# Patient Record
Sex: FEMALE | Race: WHITE | URBAN - METROPOLITAN AREA
[De-identification: names, ages, dates, MRNs, and addresses within clinical notes are randomized per-mention and may not be internally consistent; named-entity substitution may affect disease eponyms.]

---

## 2020-09-08 ENCOUNTER — OFFICE VISIT (OUTPATIENT)
Dept: URBAN - METROPOLITAN AREA CLINIC 72 | Facility: CLINIC | Age: 70
End: 2020-09-08
Payer: MEDICARE

## 2020-09-08 DIAGNOSIS — H52.4 PRESBYOPIA: ICD-10-CM

## 2020-09-08 DIAGNOSIS — H25.813 COMBINED FORMS OF AGE-RELATED CATARACT, BILATERAL: Primary | ICD-10-CM

## 2020-09-08 PROCEDURE — 99204 OFFICE O/P NEW MOD 45 MIN: CPT | Performed by: OPTOMETRIST

## 2020-09-08 PROCEDURE — 92133 CPTRZD OPH DX IMG PST SGM ON: CPT | Performed by: OPTOMETRIST

## 2020-09-08 PROCEDURE — 92134 CPTRZ OPH DX IMG PST SGM RTA: CPT | Performed by: OPTOMETRIST

## 2020-09-08 ASSESSMENT — INTRAOCULAR PRESSURE
OD: 15
OS: 16

## 2020-09-08 ASSESSMENT — VISUAL ACUITY
OS: 20/200
OD: 20/25

## 2020-09-08 NOTE — IMPRESSION/PLAN
Impression: Vitreous degeneration, right eye: H43.811 Mac OCT shows PVD OD-stable.  no holes or tears noted Plan: Observe for now

## 2020-09-08 NOTE — IMPRESSION/PLAN
Impression: Combined forms of age-related cataract, bilateral: H25.813 - discussed cataract OU and PSC changes OS. PSC changes may be secondary to previous steroid use. Left XT noted though pt reports va was equal OU at one point. OCT appears normal OD but no view OS secondary to the cataract. Discussed and recommend pre op for cat sx OS then OD.  Will do OU rather than OS only due to risk of anesometropia Plan: Sched pt N/A pre op for cat sx OS then OD

## 2020-10-06 ENCOUNTER — PRE-OPERATIVE VISIT (OUTPATIENT)
Dept: URBAN - METROPOLITAN AREA CLINIC 71 | Facility: CLINIC | Age: 70
End: 2020-10-06
Payer: MEDICARE

## 2020-10-06 DIAGNOSIS — H02.109 ECTROPION OF EYE: ICD-10-CM

## 2020-10-06 DIAGNOSIS — H25.812 COMBINED FORMS OF AGE-RELATED CATARACT, LEFT EYE: ICD-10-CM

## 2020-10-06 DIAGNOSIS — H43.811 VITREOUS DEGENERATION, RIGHT EYE: ICD-10-CM

## 2020-10-06 PROCEDURE — 92136 OPHTHALMIC BIOMETRY: CPT | Performed by: OPHTHALMOLOGY

## 2020-10-06 PROCEDURE — 92014 COMPRE OPH EXAM EST PT 1/>: CPT | Performed by: OPHTHALMOLOGY

## 2020-10-06 RX ORDER — CIPROFLOXACIN HYDROCHLORIDE 3.5 MG/ML
0.3 % SOLUTION/ DROPS TOPICAL
Qty: 5 | Refills: 0 | Status: INACTIVE
Start: 2020-10-06 | End: 2020-10-19

## 2020-10-06 NOTE — IMPRESSION/PLAN
Impression: Combined forms of age-related cataract, bilateral: H25.813. Plan: Discussed diagnosis in detail with patient. Surgical treatment is required. Patient elects to have surgery. Surgical risks and benefits were discussed, explained and understood by patient. Lid scrubs and hygiene were explained. Pre op instructions given and understood. Post op instructions given and understood. PROCEED WITH CARE PLUS CATARACT SURGERY FOR DISTANCE OU. TRIMOXI WITH CIPRO BID X 10 DAYS STARTING THE DAY BEFORE SURGERY, NO RETINAL CLEARANCE NEEDED PER DR. RENDON, CS VIDEO DONE

## 2020-10-19 ENCOUNTER — SURGERY (OUTPATIENT)
Dept: URBAN - METROPOLITAN AREA SURGERY 45 | Facility: SURGERY | Age: 70
End: 2020-10-19
Payer: MEDICARE

## 2020-10-19 ENCOUNTER — SURGERY (OUTPATIENT)
Dept: URBAN - METROPOLITAN AREA SURGERY 44 | Facility: SURGERY | Age: 70
End: 2020-10-19
Payer: MEDICARE

## 2020-10-19 PROCEDURE — G8907 PT DOC NO EVENTS ON DISCHARG: HCPCS | Performed by: CLINIC/CENTER

## 2020-10-19 PROCEDURE — 66984 XCAPSL CTRC RMVL W/O ECP: CPT | Performed by: OPHTHALMOLOGY

## 2020-10-19 PROCEDURE — G8918 PT W/O PREOP ORDER IV AB PRO: HCPCS | Performed by: CLINIC/CENTER

## 2020-10-20 ENCOUNTER — OFFICE VISIT (OUTPATIENT)
Dept: URBAN - METROPOLITAN AREA CLINIC 68 | Facility: CLINIC | Age: 70
End: 2020-10-20
Payer: MEDICARE

## 2020-10-20 PROCEDURE — 99204 OFFICE O/P NEW MOD 45 MIN: CPT | Performed by: OPHTHALMOLOGY

## 2020-10-20 PROCEDURE — 92134 CPTRZ OPH DX IMG PST SGM RTA: CPT | Performed by: OPHTHALMOLOGY

## 2020-10-26 ENCOUNTER — SURGERY (OUTPATIENT)
Dept: URBAN - METROPOLITAN AREA SURGERY 45 | Facility: SURGERY | Age: 70
End: 2020-10-26
Payer: MEDICARE

## 2020-10-26 ENCOUNTER — SURGERY (OUTPATIENT)
Dept: URBAN - METROPOLITAN AREA EXTERNAL CLINIC 26 | Facility: EXTERNAL CLINIC | Age: 70
End: 2020-10-26
Payer: MEDICARE

## 2020-10-26 PROCEDURE — 67041 VIT FOR MACULAR PUCKER: CPT | Performed by: OPHTHALMOLOGY

## 2020-10-26 PROCEDURE — G8907 PT DOC NO EVENTS ON DISCHARG: HCPCS | Performed by: CLINIC/CENTER

## 2020-10-26 PROCEDURE — G8918 PT W/O PREOP ORDER IV AB PRO: HCPCS | Performed by: CLINIC/CENTER

## 2020-10-27 ENCOUNTER — POST-OPERATIVE VISIT (OUTPATIENT)
Dept: URBAN - METROPOLITAN AREA CLINIC 73 | Facility: CLINIC | Age: 70
End: 2020-10-27
Payer: MEDICARE

## 2020-10-27 PROCEDURE — 99024 POSTOP FOLLOW-UP VISIT: CPT | Performed by: OPHTHALMOLOGY

## 2020-10-27 ASSESSMENT — INTRAOCULAR PRESSURE: OS: 11

## 2020-10-27 NOTE — IMPRESSION/PLAN
Impression: S/P 25g/PPV/remove RLF/MP/dex OS x RLF 10/26/2020 ([facility]) Plan: Retina is attached. No s/s of infection IOP is good at (11). Will need to place ACIOL. RBA discussed. The patient elects surgery. Drops Vigamox QID Pred QID Plan: 1. As and Ks 2. 25 g PPV / MP / PRP / ACIOL OS

## 2020-11-01 ENCOUNTER — SURGERY (OUTPATIENT)
Dept: URBAN - METROPOLITAN AREA EXTERNAL CLINIC 26 | Facility: EXTERNAL CLINIC | Age: 70
End: 2020-11-01
Payer: MEDICARE

## 2020-11-01 PROCEDURE — 67040 LASER TREATMENT OF RETINA: CPT | Performed by: OPHTHALMOLOGY

## 2020-11-03 ENCOUNTER — POST-OPERATIVE VISIT (OUTPATIENT)
Dept: URBAN - METROPOLITAN AREA CLINIC 68 | Facility: CLINIC | Age: 70
End: 2020-11-03
Payer: MEDICARE

## 2020-11-03 PROCEDURE — 99024 POSTOP FOLLOW-UP VISIT: CPT | Performed by: OPHTHALMOLOGY

## 2020-11-03 ASSESSMENT — INTRAOCULAR PRESSURE
OD: 15
OS: 12

## 2020-11-03 NOTE — IMPRESSION/PLAN
Impression: S/P 25G RLF, ILM, PRP. AC IOL OS 11/2/2020 (0 [facility]) Plan: Retina is attached. No s/s of infection IOP is good at (12) Drops Vigamox QID Pred QID 

RTC 1-2 weeks

## 2020-11-10 ENCOUNTER — POST-OPERATIVE VISIT (OUTPATIENT)
Dept: URBAN - METROPOLITAN AREA CLINIC 73 | Facility: CLINIC | Age: 70
End: 2020-11-10
Payer: MEDICARE

## 2020-11-10 PROCEDURE — 99024 POSTOP FOLLOW-UP VISIT: CPT | Performed by: OPHTHALMOLOGY

## 2020-11-10 ASSESSMENT — INTRAOCULAR PRESSURE
OD: 16
OS: 19

## 2020-11-10 NOTE — IMPRESSION/PLAN
Impression: S/P 25G RLF, ILM, PRP. AC IOL OS 11/02/2020  ([facility]) Plan: Retina is attached. No s/s of infection IOP is good at (19) Drops Vigamox QID Pred QID 

RTC 3 months, OCT OU

## 2020-11-17 ENCOUNTER — POST-OPERATIVE VISIT (OUTPATIENT)
Dept: URBAN - METROPOLITAN AREA CLINIC 71 | Facility: CLINIC | Age: 70
End: 2020-11-17
Payer: MEDICARE

## 2020-11-17 DIAGNOSIS — Z48.810 ENCOUNTER FOR SURGICAL AFTERCARE FOLLOWING SURGERY ON A SENSE ORGAN: Primary | ICD-10-CM

## 2020-11-17 PROCEDURE — 99024 POSTOP FOLLOW-UP VISIT: CPT | Performed by: OPHTHALMOLOGY

## 2020-11-17 ASSESSMENT — INTRAOCULAR PRESSURE
OD: 15
OS: 13

## 2020-11-17 NOTE — IMPRESSION/PLAN
Impression: S/P 25G RLF, ILM, PRP. AC IOL OS OS - 15 Days. Encounter for surgical aftercare following surgery on a sense organ  Z48.810. Plan: looks good, healing well. Ok to proceed with the second eye.

## 2020-12-01 ENCOUNTER — SURGERY (OUTPATIENT)
Dept: URBAN - METROPOLITAN AREA SURGERY 44 | Facility: SURGERY | Age: 70
End: 2020-12-01
Payer: MEDICARE

## 2020-12-01 PROCEDURE — 66984 XCAPSL CTRC RMVL W/O ECP: CPT | Performed by: OPHTHALMOLOGY

## 2020-12-02 ENCOUNTER — POST-OPERATIVE VISIT (OUTPATIENT)
Dept: URBAN - METROPOLITAN AREA CLINIC 71 | Facility: CLINIC | Age: 70
End: 2020-12-02
Payer: MEDICARE

## 2020-12-02 PROCEDURE — 99024 POSTOP FOLLOW-UP VISIT: CPT | Performed by: OPHTHALMOLOGY

## 2020-12-02 ASSESSMENT — INTRAOCULAR PRESSURE
OD: 14
OS: 10

## 2020-12-02 NOTE — IMPRESSION/PLAN
Impression: S/P CE/Standard IOL SA60AT 24.0 ORA OD - 1 Day. Presence of intraocular lens  Z96.1. Plan: looks good, healing well.  Will have pt follow up with Dr Soni Greene --Continue Ofloxacin 0.3%--Continue Pred-Acetate--Continue flurbiprofen

## 2021-03-02 ENCOUNTER — OFFICE VISIT (OUTPATIENT)
Dept: URBAN - METROPOLITAN AREA CLINIC 73 | Facility: CLINIC | Age: 71
End: 2021-03-02
Payer: MEDICARE

## 2021-03-02 PROCEDURE — 99214 OFFICE O/P EST MOD 30 MIN: CPT | Performed by: OPHTHALMOLOGY

## 2021-03-02 PROCEDURE — 67028 INJECTION EYE DRUG: CPT | Performed by: OPHTHALMOLOGY

## 2021-03-02 PROCEDURE — 92134 CPTRZ OPH DX IMG PST SGM RTA: CPT | Performed by: OPHTHALMOLOGY

## 2021-03-02 ASSESSMENT — INTRAOCULAR PRESSURE
OS: 15
OD: 14

## 2021-03-02 NOTE — IMPRESSION/PLAN
Impression: CME, OS. Plan: s/p surgery OS. Exam and OCT reveal CME OS (458 microns). Recommend Triesence. RBA discussed. Pt elects Triesence. After 2% Subconjunctival anesthesia & betadine prep, 0.2 mg Triesence was injected in the eye. 38 mg of the vial was discarded. No complications. Cold compress and Tylenol suggested for pain if needed. Thanks, Carey Return in 1 month for re-eval CME, OCT OU, IVFA OS 1st

## 2021-03-02 NOTE — IMPRESSION/PLAN
Impression: h/o RLF OS
 S/P 25G RLF, ILM, PRP. AC IOL OS 11/02/2020  (0 [facility]) Plan: The patient has done well with surgery.  Follow

## 2021-03-30 ENCOUNTER — OFFICE VISIT (OUTPATIENT)
Dept: URBAN - METROPOLITAN AREA CLINIC 73 | Facility: CLINIC | Age: 71
End: 2021-03-30
Payer: MEDICARE

## 2021-03-30 DIAGNOSIS — Z96.1 PRESENCE OF INTRAOCULAR LENS: ICD-10-CM

## 2021-03-30 PROCEDURE — 92014 COMPRE OPH EXAM EST PT 1/>: CPT | Performed by: OPHTHALMOLOGY

## 2021-03-30 PROCEDURE — 67028 INJECTION EYE DRUG: CPT | Performed by: OPHTHALMOLOGY

## 2021-03-30 PROCEDURE — 92235 FLUORESCEIN ANGRPH MLTIFRAME: CPT | Performed by: OPHTHALMOLOGY

## 2021-03-30 PROCEDURE — 92250 FUNDUS PHOTOGRAPHY W/I&R: CPT | Performed by: OPHTHALMOLOGY

## 2021-03-30 ASSESSMENT — INTRAOCULAR PRESSURE
OS: 10
OD: 9

## 2021-03-30 NOTE — IMPRESSION/PLAN
Impression: CME, OS.
  s/p Triesence x1 last 3/2/21 Plan: s/p surgery OS. Exam and OCT reveal CME OS (prior 458 microns). An IVFA from 03/30/2021 demonstrated no telangiectasia. Fundus Photos from 03/30/2021 demonstrated no IRH. Recommend Triesence. RBA discussed. Pt elects Triesence. After 2% Subconjunctival anesthesia & betadine prep, 0.2 mg Triesence was injected in the eye. 38 mg of the vial was discarded. No complications. Cold compress and Tylenol suggested for pain if needed. Thanks, Carey Return in 1 month for re-eval CME, OCT OU, possible Triesence

## 2021-03-30 NOTE — IMPRESSION/PLAN
Impression: h/o RLF OS
 S/P 25G RLF, ILM, PRP. AC IOL OS 11/02/2020  (0 [facility]) Plan: The patient has done well with surgery.  Follow

## 2021-05-18 ENCOUNTER — OFFICE VISIT (OUTPATIENT)
Dept: URBAN - METROPOLITAN AREA CLINIC 68 | Facility: CLINIC | Age: 71
End: 2021-05-18
Payer: MEDICARE

## 2021-05-18 DIAGNOSIS — H43.813 VITREOUS DEGENERATION, BILATERAL: ICD-10-CM

## 2021-05-18 DIAGNOSIS — H35.81 RETINAL EDEMA: Primary | ICD-10-CM

## 2021-05-18 PROCEDURE — 67028 INJECTION EYE DRUG: CPT | Performed by: OPHTHALMOLOGY

## 2021-05-18 PROCEDURE — 92014 COMPRE OPH EXAM EST PT 1/>: CPT | Performed by: OPHTHALMOLOGY

## 2021-05-18 PROCEDURE — 92134 CPTRZ OPH DX IMG PST SGM RTA: CPT | Performed by: OPHTHALMOLOGY

## 2021-05-18 ASSESSMENT — INTRAOCULAR PRESSURE
OS: 8
OD: 11

## 2021-05-18 NOTE — IMPRESSION/PLAN
Impression: h/o RLF OS
 S/P 25G RLF, ILM, PRP. AC IOL OS 11/02/2020  (0 [facility]) Plan: The patient has done well with surgery.  Follow

## 2021-05-18 NOTE — IMPRESSION/PLAN
Impression: CME, OS.
  s/p Triesence x 2  last 03/30/2021  Plan: s/p surgery OS. Exam and OCT reveal CME OS (374 microns, from 458 microns). An IVFA from 03/30/2021 demonstrated no telangiectasia. Fundus Photos from 03/30/2021 demonstrated no IRH. Recommend Triesence. RBA discussed. Pt elects Triesence. After 2% Subconjunctival anesthesia & betadine prep, 0.2 mg Triesence was injected in the eye. 38 mg of the vial was discarded. No complications. Cold compress and Tylenol suggested for pain if needed. Thanks, [employer] Return in 1 month for re-eval CME, OCT OU, possible Triesence

## 2021-08-31 ENCOUNTER — OFFICE VISIT (OUTPATIENT)
Dept: URBAN - METROPOLITAN AREA CLINIC 73 | Facility: CLINIC | Age: 71
End: 2021-08-31
Payer: MEDICARE

## 2021-08-31 DIAGNOSIS — H59.022 CATARACT (LENS) FRAGMENTS IN EYE FOLLOWING CATARACT SURGERY, LEFT EYE: ICD-10-CM

## 2021-08-31 DIAGNOSIS — H04.123 DRY EYE SYNDROME OF BILATERAL LACRIMAL GLANDS: ICD-10-CM

## 2021-08-31 PROCEDURE — 92014 COMPRE OPH EXAM EST PT 1/>: CPT | Performed by: OPHTHALMOLOGY

## 2021-08-31 PROCEDURE — 67028 INJECTION EYE DRUG: CPT | Performed by: OPHTHALMOLOGY

## 2021-08-31 ASSESSMENT — INTRAOCULAR PRESSURE
OS: 14
OD: 15

## 2021-08-31 NOTE — IMPRESSION/PLAN
Impression: h/o RLF OS
 S/P 25G RLF, ILM, PRP. AC IOL OS 11/02/2020  (0 [facility]) Plan: The patient has done well with surgery.  Follow

## 2021-08-31 NOTE — IMPRESSION/PLAN
Impression: CME, OS.
  s/p Triesence x 3  last 05/18/2021  Plan: s/p surgery OS. Exam and OCT reveal CME OS (374 microns, from 458 microns). An IVFA from 03/30/2021 demonstrated no telangiectasia. Fundus Photos from 03/30/2021 demonstrated no IRH. Recommend Triesence. RBA discussed. Pt elects Triesence. After 2% Subconjunctival anesthesia & betadine prep, 0.2 mg Triesence was injected in the eye. 38 mg of the vial was discarded. No complications. Cold compress and Tylenol suggested for pain if needed. Thanks, She Llanos Return in 1 month for re-eval CME, OCT OU, possible Triesence

## 2022-02-01 ENCOUNTER — OFFICE VISIT (OUTPATIENT)
Dept: URBAN - METROPOLITAN AREA CLINIC 73 | Facility: CLINIC | Age: 72
End: 2022-02-01
Payer: MEDICARE

## 2022-02-01 PROCEDURE — 92014 COMPRE OPH EXAM EST PT 1/>: CPT | Performed by: OPHTHALMOLOGY

## 2022-02-01 PROCEDURE — 67028 INJECTION EYE DRUG: CPT | Performed by: OPHTHALMOLOGY

## 2022-02-01 PROCEDURE — 92134 CPTRZ OPH DX IMG PST SGM RTA: CPT | Performed by: OPHTHALMOLOGY

## 2022-02-01 ASSESSMENT — INTRAOCULAR PRESSURE
OD: 15
OS: 17

## 2022-02-01 NOTE — IMPRESSION/PLAN
Impression: CME, OS.
  s/p Triesence x 4  last 08/31/2021  Plan: The patient was lost to follow up. s/p surgery OS. Exam and OCT reveal CME OS (522 microns, from 374 microns, from 458 microns). An IVFA from 03/30/2021 demonstrated no telangiectasia. Fundus Photos from 03/30/2021 demonstrated no IRH. Triesence is on back order. Recommend Avastin. RBA discussed. Pt elects Avastin. After 2% Subconjunctival anesthesia & betadine prep, Avastin was injected in the eye. Overall was discarded. No complications. Cold compress and Tylenol suggested for pain if needed. Thanks, Hemanth Steiner Return in 1 month for re-eval CME, OCT OU, possible Triesence vs Avastin

## 2022-03-01 ENCOUNTER — OFFICE VISIT (OUTPATIENT)
Dept: URBAN - METROPOLITAN AREA CLINIC 73 | Facility: CLINIC | Age: 72
End: 2022-03-01
Payer: MEDICARE

## 2022-03-01 PROCEDURE — 92134 CPTRZ OPH DX IMG PST SGM RTA: CPT | Performed by: OPHTHALMOLOGY

## 2022-03-01 PROCEDURE — 67028 INJECTION EYE DRUG: CPT | Performed by: OPHTHALMOLOGY

## 2022-03-01 PROCEDURE — 92014 COMPRE OPH EXAM EST PT 1/>: CPT | Performed by: OPHTHALMOLOGY

## 2022-03-01 ASSESSMENT — INTRAOCULAR PRESSURE
OS: 9
OD: 12

## 2022-03-01 NOTE — IMPRESSION/PLAN
Impression: CME, OS.
  s/p Triesence x 4  last 08/31/2021
s/p Avastin x last 02/01/2022 Plan: Exam and OCT reveal CME OS (559 microns, from 374 microns, from 458 microns). An IVFA from 03/30/2021 demonstrated no telangiectasia. Fundus Photos from 03/30/2021 demonstrated no IRH. Triesence is on back order. Recommend Avastin. RBA discussed. Pt elects Avastin. After 2% Subconjunctival anesthesia & betadine prep, Avastin was injected in the eye. Overall was discarded. No complications. Cold compress and Tylenol suggested for pain if needed. Thanks, Carey Return in 1 month for re-eval CME, OCT OU, possible Triesence vs Avastin

## 2022-03-01 NOTE — IMPRESSION/PLAN
Impression: h/o RLF OS
 S/P 25G RLF, ILM, PRP. AC IOL OS 11/02/2020  (0 [facility]) Plan: The patient has done well with surgery.  Follow

## 2022-04-26 ENCOUNTER — OFFICE VISIT (OUTPATIENT)
Dept: URBAN - METROPOLITAN AREA CLINIC 73 | Facility: CLINIC | Age: 72
End: 2022-04-26
Payer: MEDICARE

## 2022-04-26 DIAGNOSIS — H04.123 DRY EYE SYNDROME OF BILATERAL LACRIMAL GLANDS: ICD-10-CM

## 2022-04-26 DIAGNOSIS — Z96.1 PRESENCE OF INTRAOCULAR LENS: ICD-10-CM

## 2022-04-26 DIAGNOSIS — H35.81 RETINAL EDEMA: Primary | ICD-10-CM

## 2022-04-26 DIAGNOSIS — H43.813 VITREOUS DEGENERATION, BILATERAL: ICD-10-CM

## 2022-04-26 DIAGNOSIS — H59.022 CATARACT (LENS) FRAGMENTS IN EYE FOLLOWING CATARACT SURGERY, LEFT EYE: ICD-10-CM

## 2022-04-26 PROCEDURE — 92014 COMPRE OPH EXAM EST PT 1/>: CPT | Performed by: OPHTHALMOLOGY

## 2022-04-26 PROCEDURE — 92134 CPTRZ OPH DX IMG PST SGM RTA: CPT | Performed by: OPHTHALMOLOGY

## 2022-04-26 PROCEDURE — 67028 INJECTION EYE DRUG: CPT | Performed by: OPHTHALMOLOGY

## 2022-04-26 ASSESSMENT — INTRAOCULAR PRESSURE
OS: 10
OD: 12

## 2022-04-26 NOTE — IMPRESSION/PLAN
Impression: CME, OS.
  s/p Triesence x 4  last 08/31/2021
s/p Avastin x last 03/01/2022  Plan: Exam and OCT reveal CME OS (503 microns, from 374 microns, from 458 microns). An IVFA from 03/30/2021 demonstrated no telangiectasia. Fundus Photos from 03/30/2021 demonstrated no IRH. Triesence is on back order. Recommend Avastin. RBA discussed. Pt elects Avastin. After 2% Subconjunctival anesthesia & betadine prep, Avastin was injected in the eye. Overall was discarded. No complications. Cold compress and Tylenol suggested for pain if needed. Thanks, Carey Return in 1 month for re-eval CME, OCT OU, possible Triesence vs Avastin

## 2022-06-28 ENCOUNTER — OFFICE VISIT (OUTPATIENT)
Dept: URBAN - METROPOLITAN AREA CLINIC 68 | Facility: CLINIC | Age: 72
End: 2022-06-28
Payer: MEDICARE

## 2022-06-28 DIAGNOSIS — Z96.1 PRESENCE OF INTRAOCULAR LENS: ICD-10-CM

## 2022-06-28 DIAGNOSIS — H43.813 VITREOUS DEGENERATION, BILATERAL: ICD-10-CM

## 2022-06-28 DIAGNOSIS — H04.123 DRY EYE SYNDROME OF BILATERAL LACRIMAL GLANDS: ICD-10-CM

## 2022-06-28 DIAGNOSIS — H59.022 CATARACT (LENS) FRAGMENTS IN EYE FOLLOWING CATARACT SURGERY, LEFT EYE: ICD-10-CM

## 2022-06-28 DIAGNOSIS — H35.81 RETINAL EDEMA: Primary | ICD-10-CM

## 2022-06-28 PROCEDURE — 92134 CPTRZ OPH DX IMG PST SGM RTA: CPT | Performed by: OPHTHALMOLOGY

## 2022-06-28 PROCEDURE — 92014 COMPRE OPH EXAM EST PT 1/>: CPT | Performed by: OPHTHALMOLOGY

## 2022-06-28 PROCEDURE — 67028 INJECTION EYE DRUG: CPT | Performed by: OPHTHALMOLOGY

## 2022-06-28 ASSESSMENT — INTRAOCULAR PRESSURE
OS: 12
OD: 15

## 2022-06-28 NOTE — IMPRESSION/PLAN
Impression: h/o RLF OS
 S/P 25G RLF, ILM, PRP. AC IOL OS 11/02/2020  (0 [facility]) Plan: The patient has done well with surgery.  Follow

## 2022-06-28 NOTE — IMPRESSION/PLAN
Impression: CME, OS.
  s/p Triesence x 4  last 08/31/2021
s/p Avastin x last 04/26/2022 Plan: Exam and OCT reveal CME OS (521 microns, from 374 microns, from 458 microns). An IVFA from 03/30/2021 demonstrated no telangiectasia. Fundus Photos from 03/30/2021 demonstrated no IRH. Triesence is on back order. Recommend Avastin. RBA discussed. Pt elects Avastin. After 2% Subconjunctival anesthesia & betadine prep, Avastin was injected in the eye. Overall was discarded. No complications. Cold compress and Tylenol suggested for pain if needed. Thanks, Carey Return in 1 month for re-eval CME, OCT OU, possible Triesence vs Avastin

## 2022-09-27 ENCOUNTER — OFFICE VISIT (OUTPATIENT)
Facility: LOCATION | Age: 72
End: 2022-09-27
Payer: MEDICARE

## 2022-09-27 DIAGNOSIS — Z96.1 PRESENCE OF INTRAOCULAR LENS: ICD-10-CM

## 2022-09-27 DIAGNOSIS — H59.022 CATARACT (LENS) FRAGMENTS IN EYE FOLLOWING CATARACT SURGERY, LEFT EYE: ICD-10-CM

## 2022-09-27 DIAGNOSIS — H35.81 RETINAL EDEMA: Primary | ICD-10-CM

## 2022-09-27 DIAGNOSIS — H04.123 DRY EYE SYNDROME OF BILATERAL LACRIMAL GLANDS: ICD-10-CM

## 2022-09-27 DIAGNOSIS — H43.813 VITREOUS DEGENERATION, BILATERAL: ICD-10-CM

## 2022-09-27 PROCEDURE — 92134 CPTRZ OPH DX IMG PST SGM RTA: CPT | Performed by: OPHTHALMOLOGY

## 2022-09-27 PROCEDURE — 67028 INJECTION EYE DRUG: CPT | Performed by: OPHTHALMOLOGY

## 2022-09-27 PROCEDURE — 92014 COMPRE OPH EXAM EST PT 1/>: CPT | Performed by: OPHTHALMOLOGY

## 2022-09-27 ASSESSMENT — INTRAOCULAR PRESSURE
OD: 11
OS: 13

## 2022-09-27 NOTE — IMPRESSION/PLAN
Impression: CME, OS.
  s/p Triesence x 4  last 08/31/2021
s/p Avastin x last 06/28/2022  Plan: Exam and OCT reveal CME OS (469 microns, from 521 microns, from 374 microns, from 458 microns). An IVFA from 03/30/2021 demonstrated no telangiectasia. Fundus Photos from 03/30/2021 demonstrated no IRH. Triesence is on back order. Recommend Avastin. RBA discussed. Pt elects Avastin. After 2% Subconjunctival anesthesia & betadine prep, Avastin was injected in the eye. Overall was discarded. No complications. Cold compress and Tylenol suggested for pain if needed. Thanks, Carey Return in 1 month for re-eval CME, OCT OU, possible Triesence vs Avastin

## 2022-09-27 NOTE — IMPRESSION/PLAN
Impression: h/o RLF OS
 S/P 25G RLF, ILM, PRP. AC IOL OS 11/02/2020  (0 [facility]) Plan: The patient has done well with surgery.  Follow

## 2022-11-15 ENCOUNTER — OFFICE VISIT (OUTPATIENT)
Facility: LOCATION | Age: 72
End: 2022-11-15
Payer: MEDICARE

## 2022-11-15 DIAGNOSIS — H59.022 CATARACT (LENS) FRAGMENTS IN EYE FOLLOWING CATARACT SURGERY, LEFT EYE: ICD-10-CM

## 2022-11-15 DIAGNOSIS — H43.813 VITREOUS DEGENERATION, BILATERAL: ICD-10-CM

## 2022-11-15 DIAGNOSIS — H04.123 DRY EYE SYNDROME OF BILATERAL LACRIMAL GLANDS: ICD-10-CM

## 2022-11-15 DIAGNOSIS — H35.81 RETINAL EDEMA: Primary | ICD-10-CM

## 2022-11-15 DIAGNOSIS — Z96.1 PRESENCE OF INTRAOCULAR LENS: ICD-10-CM

## 2022-11-15 PROCEDURE — 92014 COMPRE OPH EXAM EST PT 1/>: CPT | Performed by: OPHTHALMOLOGY

## 2022-11-15 PROCEDURE — 67028 INJECTION EYE DRUG: CPT | Performed by: OPHTHALMOLOGY

## 2022-11-15 PROCEDURE — 92134 CPTRZ OPH DX IMG PST SGM RTA: CPT | Performed by: OPHTHALMOLOGY

## 2022-11-15 ASSESSMENT — INTRAOCULAR PRESSURE
OD: 12
OS: 13

## 2022-11-15 NOTE — IMPRESSION/PLAN
Impression: CME, OS.
  s/p Triesence x 4  last 08/31/2021
s/p Avastin x last 09/27/2022 Plan: Exam and OCT reveal CME OS (467 microns, from 521 microns, from 374 microns, from 458 microns). An IVFA from 03/30/2021 demonstrated no telangiectasia. Fundus Photos from 03/30/2021 demonstrated no IRH. Triesence is on back order. Recommend Avastin. RBA discussed. Pt elects Avastin. After 2% Subconjunctival anesthesia & betadine prep, Avastin was injected in the eye. Overall was discarded. No complications. Cold compress and Tylenol suggested for pain if needed. Thanks, Carey Return in 1 month for re-eval CME, OCT OU, possible Triesence vs Avastin

## 2022-11-15 NOTE — IMPRESSION/PLAN
Impression: h/o RLF OS
 S/P 25G RLF, ILM, PRP. AC IOL OS 11/02/2020  (0 [facility]) Plan: The patient has done well with surgery.  Follow

## 2023-01-10 ENCOUNTER — OFFICE VISIT (OUTPATIENT)
Facility: LOCATION | Age: 73
End: 2023-01-10
Payer: MEDICARE

## 2023-01-10 DIAGNOSIS — H59.022 CATARACT (LENS) FRAGMENTS IN EYE FOLLOWING CATARACT SURGERY, LEFT EYE: ICD-10-CM

## 2023-01-10 DIAGNOSIS — H04.123 DRY EYE SYNDROME OF BILATERAL LACRIMAL GLANDS: ICD-10-CM

## 2023-01-10 DIAGNOSIS — H43.813 VITREOUS DEGENERATION, BILATERAL: ICD-10-CM

## 2023-01-10 DIAGNOSIS — Z96.1 PRESENCE OF INTRAOCULAR LENS: ICD-10-CM

## 2023-01-10 DIAGNOSIS — H35.81 RETINAL EDEMA: Primary | ICD-10-CM

## 2023-01-10 PROCEDURE — 92014 COMPRE OPH EXAM EST PT 1/>: CPT | Performed by: OPHTHALMOLOGY

## 2023-01-10 PROCEDURE — 67028 INJECTION EYE DRUG: CPT | Performed by: OPHTHALMOLOGY

## 2023-01-10 PROCEDURE — 92134 CPTRZ OPH DX IMG PST SGM RTA: CPT | Performed by: OPHTHALMOLOGY

## 2023-01-10 ASSESSMENT — INTRAOCULAR PRESSURE
OD: 15
OS: 12

## 2023-01-10 NOTE — IMPRESSION/PLAN
Impression: CME, OS.
s/p Triesence x 4  last 08/31/2021
s/p Avastin x last 11/15/2022 Plan: Exam and OCT reveal CME OS (579 microns, from 467 microns, from 521 microns, from 374 microns, from 458 microns). An IVFA from 03/30/2021 demonstrated no telangiectasia. Fundus Photos from 03/30/2021 demonstrated no IRH. Triesence is on back order. Recommend Avastin. RBA discussed. Pt elects Avastin. After 2% Subconjunctival anesthesia & betadine prep, Avastin was injected in the eye. Overall was discarded. No complications. Cold compress and Tylenol suggested for pain if needed. Thanks, Carey Return in 1 month for re-eval CME, OCT OU, possible Triesence vs Avastin

## 2023-01-10 NOTE — IMPRESSION/PLAN
Impression: h/o RLF OS
 S/P 25G RLF, ILM, PRP. AC IOL OS 11/02/2020  (0 [facility]) Plan: The patient has done well with surgery.  Follow

## 2023-02-21 ENCOUNTER — OFFICE VISIT (OUTPATIENT)
Facility: LOCATION | Age: 73
End: 2023-02-21
Payer: MEDICARE

## 2023-02-21 DIAGNOSIS — H04.123 DRY EYE SYNDROME OF BILATERAL LACRIMAL GLANDS: ICD-10-CM

## 2023-02-21 DIAGNOSIS — H35.81 RETINAL EDEMA: Primary | ICD-10-CM

## 2023-02-21 DIAGNOSIS — H59.022 CATARACT (LENS) FRAGMENTS IN EYE FOLLOWING CATARACT SURGERY, LEFT EYE: ICD-10-CM

## 2023-02-21 DIAGNOSIS — Z96.1 PRESENCE OF INTRAOCULAR LENS: ICD-10-CM

## 2023-02-21 DIAGNOSIS — H43.813 VITREOUS DEGENERATION, BILATERAL: ICD-10-CM

## 2023-02-21 PROCEDURE — 92134 CPTRZ OPH DX IMG PST SGM RTA: CPT | Performed by: OPHTHALMOLOGY

## 2023-02-21 PROCEDURE — 92014 COMPRE OPH EXAM EST PT 1/>: CPT | Performed by: OPHTHALMOLOGY

## 2023-02-21 PROCEDURE — 67028 INJECTION EYE DRUG: CPT | Performed by: OPHTHALMOLOGY

## 2023-02-21 ASSESSMENT — INTRAOCULAR PRESSURE
OS: 12
OD: 14

## 2023-02-21 NOTE — IMPRESSION/PLAN
Impression: CME, OS.
s/p Triesence x 4  last 08/31/2021
s/p Avastin x last 01/10/2023 Plan: Exam and OCT reveal CME OS (428 microns, from 579 microns, from 467 microns, from 521 microns, from 374 microns, from 458 microns). An IVFA from 03/30/2021 demonstrated no telangiectasia. Fundus Photos from 03/30/2021 demonstrated no IRH. Triesence is on back order. Recommend Avastin. RBA discussed. Pt elects Avastin. After 2% Subconjunctival anesthesia & betadine prep, Avastin was injected in the eye. Overall was discarded. No complications. Cold compress and Tylenol suggested for pain if needed. Thanks, Carey Return in 1 month for re-eval CME, OCT OU, possible Triesence vs Avastin

## 2023-04-18 ENCOUNTER — OFFICE VISIT (OUTPATIENT)
Facility: LOCATION | Age: 73
End: 2023-04-18
Payer: MEDICARE

## 2023-04-18 DIAGNOSIS — H04.123 DRY EYE SYNDROME OF BILATERAL LACRIMAL GLANDS: ICD-10-CM

## 2023-04-18 DIAGNOSIS — H35.81 RETINAL EDEMA: Primary | ICD-10-CM

## 2023-04-18 DIAGNOSIS — H43.813 VITREOUS DEGENERATION, BILATERAL: ICD-10-CM

## 2023-04-18 DIAGNOSIS — Z96.1 PRESENCE OF INTRAOCULAR LENS: ICD-10-CM

## 2023-04-18 DIAGNOSIS — H59.022 CATARACT (LENS) FRAGMENTS IN EYE FOLLOWING CATARACT SURGERY, LEFT EYE: ICD-10-CM

## 2023-04-18 PROCEDURE — 92134 CPTRZ OPH DX IMG PST SGM RTA: CPT | Performed by: OPHTHALMOLOGY

## 2023-04-18 PROCEDURE — 67028 INJECTION EYE DRUG: CPT | Performed by: OPHTHALMOLOGY

## 2023-04-18 PROCEDURE — 92014 COMPRE OPH EXAM EST PT 1/>: CPT | Performed by: OPHTHALMOLOGY

## 2023-04-18 ASSESSMENT — INTRAOCULAR PRESSURE
OD: 15
OS: 17

## 2023-04-18 NOTE — IMPRESSION/PLAN
Impression: CME, OS.
s/p Triesence x 4  last 08/31/2021
s/p Avastin x last 02/21/2023  Plan: Exam and OCT reveal CME OS (491 microns, from 428 microns, from 579 microns, from 467 microns, from 521 microns, from 374 microns, from 458 microns). An IVFA from 03/30/2021 demonstrated no telangiectasia. Fundus Photos from 03/30/2021 demonstrated no IRH. Triesence is on back order. Recommend Avastin. RBA discussed. Pt elects Avastin. After 2% Subconjunctival anesthesia & betadine prep, Avastin was injected in the eye. Overall was discarded. No complications. Cold compress and Tylenol suggested for pain if needed. Carotenoids. Thanks, Carey Return in 6-8 weeks for re-eval CME, OCT OU, possible Triesence vs Avastin

## 2023-04-18 NOTE — IMPRESSION/PLAN
Impression: h/o RLF OS
 S/P 25G RLF, ILM, PRP. AC IOL OS 11/02/2020  (0 [facility]) Plan: The patient has done well with surgery.  Follow

## 2023-07-11 ENCOUNTER — OFFICE VISIT (OUTPATIENT)
Facility: LOCATION | Age: 73
End: 2023-07-11
Payer: MEDICARE

## 2023-07-11 DIAGNOSIS — H59.022 CATARACT (LENS) FRAGMENTS IN EYE FOLLOWING CATARACT SURGERY, LEFT EYE: ICD-10-CM

## 2023-07-11 DIAGNOSIS — H35.81 RETINAL EDEMA: Primary | ICD-10-CM

## 2023-07-11 DIAGNOSIS — Z96.1 PRESENCE OF INTRAOCULAR LENS: ICD-10-CM

## 2023-07-11 DIAGNOSIS — H43.813 VITREOUS DEGENERATION, BILATERAL: ICD-10-CM

## 2023-07-11 PROCEDURE — 92134 CPTRZ OPH DX IMG PST SGM RTA: CPT | Performed by: STUDENT IN AN ORGANIZED HEALTH CARE EDUCATION/TRAINING PROGRAM

## 2023-07-11 PROCEDURE — 67028 INJECTION EYE DRUG: CPT | Performed by: STUDENT IN AN ORGANIZED HEALTH CARE EDUCATION/TRAINING PROGRAM

## 2023-07-11 PROCEDURE — 99213 OFFICE O/P EST LOW 20 MIN: CPT | Performed by: STUDENT IN AN ORGANIZED HEALTH CARE EDUCATION/TRAINING PROGRAM

## 2023-07-11 ASSESSMENT — INTRAOCULAR PRESSURE
OD: 11
OS: 11

## 2023-07-11 NOTE — IMPRESSION/PLAN
Impression: CME, OS.
s/p Triesence x 4  last 08/31/2021
s/p Avastin x last 02/21/2023 OCT: wnl OD;  significant edema OS Plan: -stable CME OS over 3 years since vitrectomy for retained lens fragments (CCT ranges 370s - 570s OS) on Avastin q6-8 weeks
-currently Triesence is on back order
-r/b/a anti-VEGF, pt elects to proceed with Avastin OS today
-return precautions RTC 6-8 weeks OCT OU / re-eval Avastin vs STK OS

## 2023-07-11 NOTE — IMPRESSION/PLAN
Impression: h/o RLF OS
 S/P 25G RLF, ILM, PRP. AC IOL OS 11/02/2020  (0 [facility]) Plan: The patient has done well with surgery.  Follow

## 2023-08-22 ENCOUNTER — OFFICE VISIT (OUTPATIENT)
Facility: LOCATION | Age: 73
End: 2023-08-22
Payer: MEDICARE

## 2023-08-22 DIAGNOSIS — H35.81 RETINAL EDEMA: Primary | ICD-10-CM

## 2023-08-22 DIAGNOSIS — Z96.1 PRESENCE OF INTRAOCULAR LENS: ICD-10-CM

## 2023-08-22 DIAGNOSIS — H43.813 VITREOUS DEGENERATION, BILATERAL: ICD-10-CM

## 2023-08-22 DIAGNOSIS — H59.022 CATARACT (LENS) FRAGMENTS IN EYE FOLLOWING CATARACT SURGERY, LEFT EYE: ICD-10-CM

## 2023-08-22 PROCEDURE — 99213 OFFICE O/P EST LOW 20 MIN: CPT | Performed by: STUDENT IN AN ORGANIZED HEALTH CARE EDUCATION/TRAINING PROGRAM

## 2023-08-22 PROCEDURE — 67028 INJECTION EYE DRUG: CPT | Performed by: STUDENT IN AN ORGANIZED HEALTH CARE EDUCATION/TRAINING PROGRAM

## 2023-08-22 PROCEDURE — 92134 CPTRZ OPH DX IMG PST SGM RTA: CPT | Performed by: STUDENT IN AN ORGANIZED HEALTH CARE EDUCATION/TRAINING PROGRAM

## 2023-08-22 ASSESSMENT — INTRAOCULAR PRESSURE
OD: 14
OS: 12

## 2023-10-31 ENCOUNTER — OFFICE VISIT (OUTPATIENT)
Facility: LOCATION | Age: 73
End: 2023-10-31
Payer: MEDICARE

## 2023-10-31 DIAGNOSIS — H43.813 VITREOUS DEGENERATION, BILATERAL: ICD-10-CM

## 2023-10-31 DIAGNOSIS — H59.022 CATARACT (LENS) FRAGMENTS IN EYE FOLLOWING CATARACT SURGERY, LEFT EYE: ICD-10-CM

## 2023-10-31 DIAGNOSIS — Z96.1 PRESENCE OF INTRAOCULAR LENS: ICD-10-CM

## 2023-10-31 DIAGNOSIS — H35.81 RETINAL EDEMA: Primary | ICD-10-CM

## 2023-10-31 PROCEDURE — 67515 INJECT/TREAT EYE SOCKET: CPT | Performed by: STUDENT IN AN ORGANIZED HEALTH CARE EDUCATION/TRAINING PROGRAM

## 2023-10-31 PROCEDURE — 99213 OFFICE O/P EST LOW 20 MIN: CPT | Performed by: STUDENT IN AN ORGANIZED HEALTH CARE EDUCATION/TRAINING PROGRAM

## 2023-10-31 ASSESSMENT — INTRAOCULAR PRESSURE
OS: 17
OD: 14

## 2024-01-09 ENCOUNTER — OFFICE VISIT (OUTPATIENT)
Facility: LOCATION | Age: 74
End: 2024-01-09
Payer: MEDICARE

## 2024-01-09 DIAGNOSIS — Z96.1 PRESENCE OF INTRAOCULAR LENS: ICD-10-CM

## 2024-01-09 DIAGNOSIS — H35.81 RETINAL EDEMA: Primary | ICD-10-CM

## 2024-01-09 DIAGNOSIS — H43.813 VITREOUS DEGENERATION, BILATERAL: ICD-10-CM

## 2024-01-09 DIAGNOSIS — H59.022 CATARACT (LENS) FRAGMENTS IN EYE FOLLOWING CATARACT SURGERY, LEFT EYE: ICD-10-CM

## 2024-01-09 PROCEDURE — 67515 INJECT/TREAT EYE SOCKET: CPT | Performed by: STUDENT IN AN ORGANIZED HEALTH CARE EDUCATION/TRAINING PROGRAM

## 2024-01-09 PROCEDURE — 92134 CPTRZ OPH DX IMG PST SGM RTA: CPT | Performed by: STUDENT IN AN ORGANIZED HEALTH CARE EDUCATION/TRAINING PROGRAM

## 2024-01-09 PROCEDURE — 99213 OFFICE O/P EST LOW 20 MIN: CPT | Performed by: STUDENT IN AN ORGANIZED HEALTH CARE EDUCATION/TRAINING PROGRAM

## 2024-01-09 ASSESSMENT — INTRAOCULAR PRESSURE
OD: 17
OS: 13

## 2024-02-27 ENCOUNTER — OFFICE VISIT (OUTPATIENT)
Facility: LOCATION | Age: 74
End: 2024-02-27
Payer: MEDICARE

## 2024-02-27 DIAGNOSIS — H35.81 RETINAL EDEMA: Primary | ICD-10-CM

## 2024-02-27 DIAGNOSIS — Z96.1 PRESENCE OF INTRAOCULAR LENS: ICD-10-CM

## 2024-02-27 DIAGNOSIS — H59.022 CATARACT (LENS) FRAGMENTS IN EYE FOLLOWING CATARACT SURGERY, LEFT EYE: ICD-10-CM

## 2024-02-27 DIAGNOSIS — H43.813 VITREOUS DEGENERATION, BILATERAL: ICD-10-CM

## 2024-02-27 PROCEDURE — 92134 CPTRZ OPH DX IMG PST SGM RTA: CPT | Performed by: STUDENT IN AN ORGANIZED HEALTH CARE EDUCATION/TRAINING PROGRAM

## 2024-02-27 PROCEDURE — 99214 OFFICE O/P EST MOD 30 MIN: CPT | Performed by: STUDENT IN AN ORGANIZED HEALTH CARE EDUCATION/TRAINING PROGRAM

## 2024-02-27 PROCEDURE — 67028 INJECTION EYE DRUG: CPT | Performed by: STUDENT IN AN ORGANIZED HEALTH CARE EDUCATION/TRAINING PROGRAM

## 2024-02-27 ASSESSMENT — INTRAOCULAR PRESSURE
OS: 14
OD: 13

## 2024-05-28 ENCOUNTER — OFFICE VISIT (OUTPATIENT)
Facility: LOCATION | Age: 74
End: 2024-05-28
Payer: MEDICARE

## 2024-05-28 DIAGNOSIS — H35.81 RETINAL EDEMA: Primary | ICD-10-CM

## 2024-05-28 DIAGNOSIS — H43.813 VITREOUS DEGENERATION, BILATERAL: ICD-10-CM

## 2024-05-28 DIAGNOSIS — Z96.1 PRESENCE OF INTRAOCULAR LENS: ICD-10-CM

## 2024-05-28 DIAGNOSIS — H59.022 CATARACT (LENS) FRAGMENTS IN EYE FOLLOWING CATARACT SURGERY, LEFT EYE: ICD-10-CM

## 2024-05-28 PROCEDURE — 92134 CPTRZ OPH DX IMG PST SGM RTA: CPT | Performed by: STUDENT IN AN ORGANIZED HEALTH CARE EDUCATION/TRAINING PROGRAM

## 2024-05-28 PROCEDURE — 67028 INJECTION EYE DRUG: CPT | Performed by: STUDENT IN AN ORGANIZED HEALTH CARE EDUCATION/TRAINING PROGRAM

## 2024-05-28 PROCEDURE — 99214 OFFICE O/P EST MOD 30 MIN: CPT | Performed by: STUDENT IN AN ORGANIZED HEALTH CARE EDUCATION/TRAINING PROGRAM

## 2024-05-28 ASSESSMENT — INTRAOCULAR PRESSURE
OS: 16
OD: 15

## 2024-07-30 ENCOUNTER — OFFICE VISIT (OUTPATIENT)
Facility: LOCATION | Age: 74
End: 2024-07-30
Payer: MEDICARE

## 2024-07-30 DIAGNOSIS — H35.81 RETINAL EDEMA: Primary | ICD-10-CM

## 2024-07-30 PROCEDURE — 67028 INJECTION EYE DRUG: CPT | Performed by: STUDENT IN AN ORGANIZED HEALTH CARE EDUCATION/TRAINING PROGRAM

## 2024-07-30 PROCEDURE — 92134 CPTRZ OPH DX IMG PST SGM RTA: CPT | Performed by: STUDENT IN AN ORGANIZED HEALTH CARE EDUCATION/TRAINING PROGRAM

## 2024-07-30 ASSESSMENT — INTRAOCULAR PRESSURE
OS: 18
OD: 18

## 2024-10-15 ENCOUNTER — OFFICE VISIT (OUTPATIENT)
Facility: LOCATION | Age: 74
End: 2024-10-15
Payer: MEDICARE

## 2024-10-15 DIAGNOSIS — H35.81 RETINAL EDEMA: Primary | ICD-10-CM

## 2024-10-15 DIAGNOSIS — H59.022 CATARACT (LENS) FRAGMENTS IN EYE FOLLOWING CATARACT SURGERY, LEFT EYE: ICD-10-CM

## 2024-10-15 DIAGNOSIS — Z96.1 PRESENCE OF INTRAOCULAR LENS: ICD-10-CM

## 2024-10-15 DIAGNOSIS — H43.813 VITREOUS DEGENERATION, BILATERAL: ICD-10-CM

## 2024-10-15 PROCEDURE — 67028 INJECTION EYE DRUG: CPT | Performed by: STUDENT IN AN ORGANIZED HEALTH CARE EDUCATION/TRAINING PROGRAM

## 2024-10-15 PROCEDURE — 92134 CPTRZ OPH DX IMG PST SGM RTA: CPT | Performed by: STUDENT IN AN ORGANIZED HEALTH CARE EDUCATION/TRAINING PROGRAM

## 2024-10-15 PROCEDURE — 99214 OFFICE O/P EST MOD 30 MIN: CPT | Performed by: STUDENT IN AN ORGANIZED HEALTH CARE EDUCATION/TRAINING PROGRAM

## 2024-10-15 ASSESSMENT — INTRAOCULAR PRESSURE
OD: 16
OS: 12

## 2025-01-07 ENCOUNTER — OFFICE VISIT (OUTPATIENT)
Facility: LOCATION | Age: 75
End: 2025-01-07
Payer: MEDICARE

## 2025-01-07 DIAGNOSIS — H35.81 RETINAL EDEMA: Primary | ICD-10-CM

## 2025-01-07 PROCEDURE — 92134 CPTRZ OPH DX IMG PST SGM RTA: CPT | Performed by: STUDENT IN AN ORGANIZED HEALTH CARE EDUCATION/TRAINING PROGRAM

## 2025-01-07 PROCEDURE — 67028 INJECTION EYE DRUG: CPT | Performed by: STUDENT IN AN ORGANIZED HEALTH CARE EDUCATION/TRAINING PROGRAM

## 2025-07-08 ENCOUNTER — OFFICE VISIT (OUTPATIENT)
Facility: LOCATION | Age: 75
End: 2025-07-08
Payer: MEDICARE

## 2025-07-08 DIAGNOSIS — Z96.1 PRESENCE OF INTRAOCULAR LENS: ICD-10-CM

## 2025-07-08 DIAGNOSIS — H35.81 RETINAL EDEMA: Primary | ICD-10-CM

## 2025-07-08 DIAGNOSIS — H59.022 CATARACT (LENS) FRAGMENTS IN EYE FOLLOWING CATARACT SURGERY, LEFT EYE: ICD-10-CM

## 2025-07-08 DIAGNOSIS — H43.811 VITREOUS DEGENERATION, RIGHT EYE: ICD-10-CM

## 2025-07-08 PROCEDURE — 92134 CPTRZ OPH DX IMG PST SGM RTA: CPT | Performed by: STUDENT IN AN ORGANIZED HEALTH CARE EDUCATION/TRAINING PROGRAM

## 2025-07-08 PROCEDURE — 67028 INJECTION EYE DRUG: CPT | Performed by: STUDENT IN AN ORGANIZED HEALTH CARE EDUCATION/TRAINING PROGRAM

## 2025-07-08 PROCEDURE — 99214 OFFICE O/P EST MOD 30 MIN: CPT | Performed by: STUDENT IN AN ORGANIZED HEALTH CARE EDUCATION/TRAINING PROGRAM

## 2025-07-08 ASSESSMENT — INTRAOCULAR PRESSURE
OS: 12
OD: 15